# Patient Record
Sex: FEMALE | Race: WHITE | Employment: UNEMPLOYED | URBAN - METROPOLITAN AREA
[De-identification: names, ages, dates, MRNs, and addresses within clinical notes are randomized per-mention and may not be internally consistent; named-entity substitution may affect disease eponyms.]

---

## 2022-12-27 ENCOUNTER — HOSPITAL ENCOUNTER (EMERGENCY)
Age: 22
Discharge: HOME | End: 2022-12-27
Attending: EMERGENCY MEDICINE

## 2022-12-27 ENCOUNTER — APPOINTMENT (OUTPATIENT)
Dept: CT IMAGING | Age: 22
End: 2022-12-27

## 2022-12-27 VITALS
SYSTOLIC BLOOD PRESSURE: 86 MMHG | WEIGHT: 160 LBS | HEART RATE: 59 BPM | RESPIRATION RATE: 15 BRPM | OXYGEN SATURATION: 99 % | TEMPERATURE: 98.3 F | DIASTOLIC BLOOD PRESSURE: 59 MMHG

## 2022-12-27 DIAGNOSIS — R56.9 SEIZURE (HCC): Primary | ICD-10-CM

## 2022-12-27 LAB
ANION GAP SERPL CALCULATED.3IONS-SCNC: 13 MEQ/L (ref 9–15)
ANISOCYTOSIS: ABNORMAL
BASOPHILS ABSOLUTE: 0 K/UL (ref 0–0.2)
BASOPHILS RELATIVE PERCENT: 0.8 %
BUN BLDV-MCNC: 8 MG/DL (ref 6–20)
CALCIUM SERPL-MCNC: 9.2 MG/DL (ref 8.5–9.9)
CHLORIDE BLD-SCNC: 101 MEQ/L (ref 95–107)
CHP ED QC CHECK: YES
CO2: 23 MEQ/L (ref 20–31)
CREAT SERPL-MCNC: 0.81 MG/DL (ref 0.5–0.9)
EOSINOPHILS ABSOLUTE: 0.2 K/UL (ref 0–0.7)
EOSINOPHILS RELATIVE PERCENT: 2 %
GFR SERPL CREATININE-BSD FRML MDRD: >60 ML/MIN/{1.73_M2}
GLUCOSE BLD-MCNC: 85 MG/DL (ref 70–99)
GLUCOSE BLD-MCNC: 87 MG/DL
GLUCOSE BLD-MCNC: 87 MG/DL (ref 70–99)
HCG, URINE, POC: NEGATIVE
HCT VFR BLD CALC: 34 % (ref 37–47)
HEMOGLOBIN: 9.9 G/DL (ref 12–16)
HYPOCHROMIA: ABNORMAL
LYMPHOCYTES ABSOLUTE: 3.4 K/UL (ref 1–4.8)
LYMPHOCYTES RELATIVE PERCENT: 29 %
Lab: NORMAL
MCH RBC QN AUTO: 18.9 PG (ref 27–31.3)
MCHC RBC AUTO-ENTMCNC: 29 % (ref 33–37)
MCV RBC AUTO: 65.1 FL (ref 79.4–94.8)
MICROCYTES: ABNORMAL
MONOCYTES ABSOLUTE: 0.8 K/UL (ref 0.2–0.8)
MONOCYTES RELATIVE PERCENT: 6.6 %
NEGATIVE QC PASS/FAIL: NORMAL
NEUTROPHILS ABSOLUTE: 7.2 K/UL (ref 1.4–6.5)
NEUTROPHILS RELATIVE PERCENT: 62 %
OVALOCYTES: ABNORMAL
PDW BLD-RTO: 20.4 % (ref 11.5–14.5)
PERFORMED ON: NORMAL
PLATELET # BLD: 556 K/UL (ref 130–400)
PLATELET SLIDE REVIEW: ABNORMAL
POLYCHROMASIA: ABNORMAL
POSITIVE QC PASS/FAIL: NORMAL
POTASSIUM SERPL-SCNC: 3.8 MEQ/L (ref 3.4–4.9)
RBC # BLD: 5.22 M/UL (ref 4.2–5.4)
SODIUM BLD-SCNC: 137 MEQ/L (ref 135–144)
WBC # BLD: 11.6 K/UL (ref 4.8–10.8)

## 2022-12-27 PROCEDURE — 80048 BASIC METABOLIC PNL TOTAL CA: CPT

## 2022-12-27 PROCEDURE — 2580000003 HC RX 258: Performed by: EMERGENCY MEDICINE

## 2022-12-27 PROCEDURE — 99284 EMERGENCY DEPT VISIT MOD MDM: CPT

## 2022-12-27 PROCEDURE — 85025 COMPLETE CBC W/AUTO DIFF WBC: CPT

## 2022-12-27 PROCEDURE — 6360000002 HC RX W HCPCS: Performed by: EMERGENCY MEDICINE

## 2022-12-27 PROCEDURE — 70450 CT HEAD/BRAIN W/O DYE: CPT

## 2022-12-27 PROCEDURE — 96374 THER/PROPH/DIAG INJ IV PUSH: CPT

## 2022-12-27 PROCEDURE — 72125 CT NECK SPINE W/O DYE: CPT

## 2022-12-27 PROCEDURE — 36415 COLL VENOUS BLD VENIPUNCTURE: CPT

## 2022-12-27 RX ORDER — LEVETIRACETAM 500 MG/1
500 TABLET ORAL 2 TIMES DAILY
Qty: 60 TABLET | Refills: 0 | Status: SHIPPED | OUTPATIENT
Start: 2022-12-27

## 2022-12-27 RX ADMIN — SODIUM CHLORIDE 1000 MG: 9 INJECTION, SOLUTION INTRAVENOUS at 20:19

## 2022-12-27 ASSESSMENT — ENCOUNTER SYMPTOMS
PHOTOPHOBIA: 0
ABDOMINAL PAIN: 0
VOMITING: 0
SHORTNESS OF BREATH: 0

## 2022-12-28 NOTE — ED NOTES
Pt arrived with complaints of seizure activity PTA. Per ems. They were called out because family stated that was seizing for about 1 hour. Pt stated that she has been off of her meds for about 2 years. Pt is confused on arrival and is more oriented after 15 mins after arrival. Pt placed on full cardiac monitor. Call light within reach.  Will continue plan of care      Kalina Ball RN  12/27/22 2040

## 2022-12-28 NOTE — ED PROVIDER NOTES
[address] [facility] ED  EMERGENCY DEPARTMENT ENCOUNTER      Pt Name: Rachel Abraham  MRN: [id number]  [place] 2000  Date of evaluation: 12/27/2022  Provider: Mallory Crawley, [address] [place] [address]       Chief Complaint   Patient presents with    Seizures         HISTORY OF PRESENT ILLNESS   (Location/Symptom, Timing/Onset, Context/Setting, Quality, Duration, Modifying Factors, Severity)  Note limiting factors. Rachel Abraham is a 23 y.o. female who presents to the emergency department for evaluation of seizure. Patient recently moved here from [place]. She was established with neurology there, was prescribed Keppra and has not taken the medication in 2 years. Reportedly per family per EMS patient had full body shaking episode consistent with seizures. EMS arrived and the patient was postictal, no further seizure activity. Patient presents emergency department awake and alert, answering questions. She denies any recent illness. She says that now her head neck hurts after the seizure. No vision change, no fever, no chest pain or difficulty breathing, abdominal pain, back pain, numbness or weakness. HPI    Nursing Notes were reviewed. REVIEW OF SYSTEMS    (2-9 systems for level 4, 10 or more for level 5)     Review of Systems   Constitutional:  Negative for fever. Amnestic to events, seizure activity, head and neck pain   Eyes:  Negative for photophobia and visual disturbance. Respiratory:  Negative for shortness of breath. Cardiovascular:  Negative for chest pain. Gastrointestinal:  Negative for abdominal pain and vomiting. Genitourinary:  Negative for flank pain. Neurological:  Negative for weakness and numbness. All other systems reviewed and are negative. Except as noted above the remainder of the review of systems was reviewed and negative.        PAST MEDICAL HISTORY     Past Medical History:   Diagnosis Date    Scoliosis     Seizures [facility])          SURGICAL HISTORY     History reviewed. No pertinent surgical history. CURRENT MEDICATIONS       Discharge Medication List as of 12/27/2022 10:30 PM          ALLERGIES     Patient has no known allergies. FAMILY HISTORY     History reviewed. No pertinent family history. SOCIAL HISTORY       Social History     Socioeconomic History    Marital status:      Spouse name: None    Number of children: None    Years of education: None    Highest education level: None       SCREENINGS         [place] Coma Scale  Eye Opening: Spontaneous  Best Verbal Response: Confused  Best Motor Response: Obeys commands  [place] Coma Scale Score: 14                     CIWA Assessment  BP: (!) 86/59  Heart Rate: 59                 PHYSICAL EXAM    (up to 7 for level 4, 8 or more for level 5)     ED Triage Vitals   BP Temp Temp src Pulse Resp SpO2 Height Weight   -- -- -- -- -- -- -- --       Physical Exam  Vitals reviewed. Constitutional:       Appearance: She is not toxic-appearing or diaphoretic. Comments: No overt signs of head injury. No subconjunctival hemorrhage or nasal septal hematoma. Bilateral paraspinal cervical tenderness. No midline tenderness or step-off. No chest wall deformity. Pelvis stable. No upper extremity pronator drift. No lateralizing signs. Equal strength and sensation. HENT:      Head: Normocephalic and atraumatic. Nose: Nose normal.      Mouth/Throat:      Mouth: Mucous membranes are moist.   Eyes:      General: No scleral icterus. Extraocular Movements: Extraocular movements intact. Pupils: Pupils are equal, round, and reactive to light. Comments: No facial droop   Cardiovascular:      Rate and Rhythm: Normal rate and regular rhythm. Pulses: Normal pulses. Pulmonary:      Effort: Pulmonary effort is normal.      Breath sounds: Normal breath sounds. Abdominal:      General: There is no distension. Tenderness:  There is no abdominal tenderness. Musculoskeletal:         General: No deformity. Normal range of motion. Cervical back: No tenderness. Right lower leg: No edema. Left lower leg: No edema. Comments: No tongue laceration or shoulder dislocation   Skin:     Capillary Refill: Capillary refill takes less than 2 seconds. Findings: No rash. Neurological:      General: No focal deficit present. Mental Status: She is alert and oriented to person, place, and time. Cranial Nerves: No cranial nerve deficit. Sensory: No sensory deficit. Motor: No weakness. Psychiatric:      Comments: Appears tired       DIAGNOSTIC RESULTS     EKG: All EKG's are interpreted by the Emergency Department Physician who either signs or Co-signs this chart in the absence of a cardiologist.      RADIOLOGY:   Non-plain film images such as CT, Ultrasound and MRI are read by the radiologist. Plain radiographic images are visualized and preliminarily interpreted by the emergency physician with the below findings:        Interpretation per the Radiologist below, if available at the time of this note:    CT HEAD WO CONTRAST   Final Result   No acute intracranial abnormality. CT CERVICAL SPINE WO CONTRAST   Final Result   1. Straightening and reversal of the normal cervical doses likely secondary   to muscular spasm and or positioning. 2. There is no jumped or perched facet.    .               ED BEDSIDE ULTRASOUND:   Performed by ED Physician - none    LABS:  Labs Reviewed   CBC WITH AUTO DIFFERENTIAL - Abnormal; Notable for the following components:       Result Value    WBC 11.6 (*)     Hemoglobin 9.9 (*)     Hematocrit 34.0 (*)     MCV 65.1 (*)     MCH 18.9 (*)     MCHC 29.0 (*)     RDW 20.4 (*)     Platelets 353 (*)     Neutrophils Absolute 7.2 (*)     All other components within normal limits   POCT GLUCOSE - Normal   BASIC METABOLIC PANEL   POC PREGNANCY UR-QUAL   POCT GLUCOSE       All other labs were within normal range or not returned as of this dictation. EMERGENCY DEPARTMENT COURSE and DIFFERENTIAL DIAGNOSIS/MDM:   Vitals:    Vitals:    12/27/22 1940 12/27/22 1944 12/27/22 2058 12/27/22 2249   BP: 101/76   (!) 86/59   Pulse: 65  65 59   Resp: 16  20 15   Temp:  98.3 °F (36.8 °C)     TempSrc:  Oral     SpO2: 100%  99% 99%   Weight: 160 lb (72.6 kg)            Anemia present. No significant leukocytosis or electrolyte derangement. MDM  Patient presents the emergency department status post seizure. She has a known history of seizure disorder. Noncompliant with medication. Imaging ordered for possible rule out of traumatic injury. Patient observed here with no further episodes of seizure. Afebrile. Hemodynamically stable. Saturating well on room air. Neurovascular intact. History and physical not consistent with CVA/subarachnoid hemorrhage, meningitis, encephalitis or other acute life-threatening or debilitating process. Mother at bedside. REASSESSMENT      Updated on results, resting comfortably, given Keppra. Understands return precautions. Will be given referral for neurology. No objective criteria for inpatient management. CONSULTS:  None    PROCEDURES:  Unless otherwise noted below, none     Procedures        FINAL IMPRESSION      1. Seizure [facility])          DISPOSITION/PLAN   DISPOSITION Decision To Discharge 12/27/2022 10:30:04 PM      PATIENT REFERRED TO:  Wei Vargas MD  [address] [place] [address] A  [address] [facility]  [phone]          DISCHARGE MEDICATIONS:  Discharge Medication List as of 12/27/2022 10:30 PM        START taking these medications    Details   levETIRAcetam (KEPPRA) 500 MG tablet Take 1 tablet by mouth 2 times daily, Disp-60 tablet, R-0Normal           Controlled Substances Monitoring:     No flowsheet data found.     (Please note that portions of this note were completed with a voice recognition program.  Efforts were made to edit the dictations but occasionally words are mis-transcribed.)    Carlos Ryan MD (electronically signed)  Attending Emergency Physician            Carlos Ryan MD  12/29/22 Blane Wong